# Patient Record
Sex: FEMALE | URBAN - METROPOLITAN AREA
[De-identification: names, ages, dates, MRNs, and addresses within clinical notes are randomized per-mention and may not be internally consistent; named-entity substitution may affect disease eponyms.]

---

## 2021-07-07 ENCOUNTER — APPOINTMENT (RX ONLY)
Dept: URBAN - METROPOLITAN AREA CLINIC 154 | Facility: CLINIC | Age: 51
Setting detail: DERMATOLOGY
End: 2021-07-07

## 2021-07-07 DIAGNOSIS — L28.0 LICHEN SIMPLEX CHRONICUS: ICD-10-CM | Status: INADEQUATELY CONTROLLED

## 2021-07-07 PROCEDURE — ? ADDITIONAL NOTES

## 2021-07-07 PROCEDURE — ? COUNSELING

## 2021-07-07 PROCEDURE — 11901 INJECT SKIN LESIONS >7: CPT

## 2021-07-07 PROCEDURE — ? FULL BODY SKIN EXAM - DECLINED

## 2021-07-07 PROCEDURE — ? INTRALESIONAL KENALOG

## 2021-07-07 PROCEDURE — ? PRESCRIPTION

## 2021-07-07 RX ORDER — FLUTICASONE PROPIONATE 0.5 MG/G
CREAM TOPICAL
Qty: 1 | Refills: 1 | Status: ERX | COMMUNITY
Start: 2021-07-07

## 2021-07-07 RX ADMIN — FLUTICASONE PROPIONATE: 0.5 CREAM TOPICAL at 00:00

## 2021-07-07 ASSESSMENT — LOCATION DETAILED DESCRIPTION DERM
LOCATION DETAILED: RIGHT LATERAL TRAPEZIAL NECK
LOCATION DETAILED: LEFT INFERIOR POSTERIOR NECK
LOCATION DETAILED: RIGHT POSTERIOR NECK
LOCATION DETAILED: LEFT POSTERIOR NECK
LOCATION DETAILED: RIGHT MEDIAL TRAPEZIAL NECK
LOCATION DETAILED: HAIR
LOCATION DETAILED: LEFT SUPERIOR POSTERIOR NECK
LOCATION DETAILED: RIGHT INFERIOR POSTERIOR NECK

## 2021-07-07 ASSESSMENT — LOCATION ZONE DERM
LOCATION ZONE: SCALP
LOCATION ZONE: NECK

## 2021-07-07 ASSESSMENT — LOCATION SIMPLE DESCRIPTION DERM
LOCATION SIMPLE: HAIR
LOCATION SIMPLE: POSTERIOR NECK

## 2021-07-07 NOTE — HPI: BUMPS
How Severe Are Your Bumps?: mild
Have Your Bumps Been Treated?: not been treated
Is This A New Presentation, Or A Follow-Up?: Bumps
Additional History: Patient has used betamethasone dip. steroid cream it did not help.

## 2021-08-31 ENCOUNTER — APPOINTMENT (RX ONLY)
Dept: URBAN - METROPOLITAN AREA CLINIC 154 | Facility: CLINIC | Age: 51
Setting detail: DERMATOLOGY
End: 2021-08-31

## 2021-08-31 DIAGNOSIS — L28.0 LICHEN SIMPLEX CHRONICUS: ICD-10-CM | Status: INADEQUATELY CONTROLLED

## 2021-08-31 PROCEDURE — 99213 OFFICE O/P EST LOW 20 MIN: CPT

## 2021-08-31 PROCEDURE — ? TREATMENT REGIMEN

## 2021-08-31 PROCEDURE — ? COUNSELING

## 2021-08-31 PROCEDURE — ? ADDITIONAL NOTES

## 2021-08-31 PROCEDURE — ? FULL BODY SKIN EXAM - DECLINED

## 2021-08-31 ASSESSMENT — LOCATION DETAILED DESCRIPTION DERM
LOCATION DETAILED: HAIR
LOCATION DETAILED: RIGHT LATERAL TRAPEZIAL NECK
LOCATION DETAILED: LEFT POSTERIOR NECK
LOCATION DETAILED: RIGHT POSTERIOR NECK
LOCATION DETAILED: RIGHT MEDIAL TRAPEZIAL NECK

## 2021-08-31 ASSESSMENT — LOCATION ZONE DERM
LOCATION ZONE: SCALP
LOCATION ZONE: NECK

## 2021-08-31 ASSESSMENT — BSA RASH: BSA RASH: 5

## 2021-08-31 ASSESSMENT — LOCATION SIMPLE DESCRIPTION DERM
LOCATION SIMPLE: HAIR
LOCATION SIMPLE: POSTERIOR NECK

## 2021-08-31 NOTE — PROCEDURE: TREATMENT REGIMEN
Plan: Pt is to avoid scratching and picking. Do not use washcloths or soaps. Continue using cutivate PRN
Discontinue Regimen: Washcloth and scented soaps
Detail Level: Zone
Continue Regimen: Cutivate

## 2021-08-31 NOTE — PROCEDURE: MIPS QUALITY
Quality 226: Preventive Care And Screening: Tobacco Use: Screening And Cessation Intervention: Patient screened for tobacco use and is an ex/non-smoker
Quality 130: Documentation Of Current Medications In The Medical Record: Current Medications Documented
Quality 431: Preventive Care And Screening: Unhealthy Alcohol Use - Screening: Patient screened for unhealthy alcohol use using a single question and scores less than 2 times per year
Detail Level: Detailed
Quality 431: Preventive Care And Screening: Unhealthy Alcohol Use - Screening: Patient identified as an unhealthy alcohol user when screened for unhealthy alcohol use using a systematic screening method and received brief counseling

## 2022-04-25 ENCOUNTER — NEW PATIENT (OUTPATIENT)
Dept: URBAN - METROPOLITAN AREA CLINIC 49 | Facility: CLINIC | Age: 52
End: 2022-04-25

## 2022-04-25 DIAGNOSIS — H04.123: ICD-10-CM

## 2022-04-25 DIAGNOSIS — H25.13: ICD-10-CM

## 2022-04-25 DIAGNOSIS — H40.013: ICD-10-CM

## 2022-04-25 PROCEDURE — 92004 COMPRE OPH EXAM NEW PT 1/>: CPT

## 2022-04-25 PROCEDURE — 92015 DETERMINE REFRACTIVE STATE: CPT

## 2022-04-25 ASSESSMENT — KERATOMETRY
OD_K1POWER_DIOPTERS: 41.75
OD_AXISANGLE2_DEGREES: 80
OS_K2POWER_DIOPTERS: 43.00
OD_K2POWER_DIOPTERS: 42.75
OS_K1POWER_DIOPTERS: 42.25
OD_AXISANGLE_DEGREES: 170
OS_AXISANGLE_DEGREES: 174
OS_AXISANGLE2_DEGREES: 84

## 2022-04-25 ASSESSMENT — VISUAL ACUITY
OS_CC: 20/20
OS_GLARE: 20/25
OU_SC: J2
OD_CC: 20/20
OS_GLARE: 20/20
OD_GLARE: 20/25
OD_GLARE: 20/20

## 2022-04-25 ASSESSMENT — TONOMETRY
OS_IOP_MMHG: 16
OD_IOP_MMHG: 17

## 2022-05-26 ENCOUNTER — DIAGNOSTICS ONLY (OUTPATIENT)
Dept: URBAN - METROPOLITAN AREA CLINIC 48 | Facility: CLINIC | Age: 52
End: 2022-05-26

## 2022-05-26 DIAGNOSIS — H40.013: ICD-10-CM

## 2022-05-26 PROCEDURE — 92083 EXTENDED VISUAL FIELD XM: CPT

## 2022-05-26 PROCEDURE — 92133 CPTRZD OPH DX IMG PST SGM ON: CPT

## 2022-05-26 ASSESSMENT — KERATOMETRY
OS_K2POWER_DIOPTERS: 43.00
OD_AXISANGLE2_DEGREES: 80
OD_AXISANGLE_DEGREES: 170
OD_K2POWER_DIOPTERS: 42.75
OS_AXISANGLE2_DEGREES: 84
OD_K1POWER_DIOPTERS: 41.75
OS_K1POWER_DIOPTERS: 42.25
OS_AXISANGLE_DEGREES: 174

## 2023-05-01 ENCOUNTER — COMPREHENSIVE EXAM (OUTPATIENT)
Dept: URBAN - METROPOLITAN AREA CLINIC 49 | Facility: CLINIC | Age: 53
End: 2023-05-01

## 2023-05-01 DIAGNOSIS — H25.13: ICD-10-CM

## 2023-05-01 DIAGNOSIS — H04.123: ICD-10-CM

## 2023-05-01 PROCEDURE — 92014 COMPRE OPH EXAM EST PT 1/>: CPT

## 2023-05-01 ASSESSMENT — VISUAL ACUITY
OS_CC: J1+
OU_SC: J1
OD_CC: J1+
OU_SC: 20/50
OU_CC: 20/20
OS_SC: J1
OU_CC: J1+
OD_SC: 20/80
OS_SC: 20/50
OD_SC: J1
OD_CC: 20/20
OS_CC: 20/20

## 2023-05-01 ASSESSMENT — TONOMETRY
OD_IOP_MMHG: 15
OS_IOP_MMHG: 15

## 2024-05-06 ENCOUNTER — COMPREHENSIVE EXAM (OUTPATIENT)
Dept: URBAN - METROPOLITAN AREA CLINIC 49 | Facility: LOCATION | Age: 54
End: 2024-05-06

## 2024-05-06 DIAGNOSIS — H35.372: ICD-10-CM

## 2024-05-06 DIAGNOSIS — H52.4: ICD-10-CM

## 2024-05-06 DIAGNOSIS — H25.13: ICD-10-CM

## 2024-05-06 DIAGNOSIS — H40.013: ICD-10-CM

## 2024-05-06 DIAGNOSIS — H04.123: ICD-10-CM

## 2024-05-06 PROCEDURE — 99214 OFFICE O/P EST MOD 30 MIN: CPT

## 2024-05-06 PROCEDURE — 92015 DETERMINE REFRACTIVE STATE: CPT

## 2024-05-06 ASSESSMENT — VISUAL ACUITY
OU_SC: J2 @ 17IN
OD_CC: 20/20
OU_CC: 20/20
OS_GLARE: 20/25
OS_GLARE: 20/25
OS_CC: 20/20
OD_GLARE: 20/20
OD_GLARE: 20/20

## 2024-05-06 ASSESSMENT — TONOMETRY
OS_IOP_MMHG: 16
OD_IOP_MMHG: 16